# Patient Record
Sex: MALE | Race: WHITE | NOT HISPANIC OR LATINO | Employment: PART TIME | URBAN - METROPOLITAN AREA
[De-identification: names, ages, dates, MRNs, and addresses within clinical notes are randomized per-mention and may not be internally consistent; named-entity substitution may affect disease eponyms.]

---

## 2021-02-02 ENCOUNTER — HOSPITAL ENCOUNTER (EMERGENCY)
Facility: HOSPITAL | Age: 21
Discharge: HOME | End: 2021-02-02
Admitting: EMERGENCY MEDICINE
Payer: MEDICARE

## 2021-02-02 ENCOUNTER — APPOINTMENT (EMERGENCY)
Dept: RADIOLOGY | Facility: HOSPITAL | Age: 21
End: 2021-02-02
Payer: MEDICARE

## 2021-02-02 VITALS
DIASTOLIC BLOOD PRESSURE: 118 MMHG | HEART RATE: 101 BPM | OXYGEN SATURATION: 99 % | WEIGHT: 145 LBS | RESPIRATION RATE: 19 BRPM | SYSTOLIC BLOOD PRESSURE: 159 MMHG | HEIGHT: 66 IN | TEMPERATURE: 97 F | BODY MASS INDEX: 23.3 KG/M2

## 2021-02-02 DIAGNOSIS — S62.609A: ICD-10-CM

## 2021-02-02 DIAGNOSIS — S61.212A LACERATION OF RIGHT MIDDLE FINGER: Primary | ICD-10-CM

## 2021-02-02 LAB
AMPHETAMINES SERPL QL SCN: NEGATIVE
BARBITURATES UR QL: NEGATIVE
BENZODIAZ UR QL: NEGATIVE
COCAINE UR QL: NEGATIVE
METHADONE UR QL: NEGATIVE
OPIATES UR QL SCN: NEGATIVE
OXYCODONE+OXYMORPHONE UR QL SCN: POSITIVE
PCP UR QL: NEGATIVE
THC UR QL: POSITIVE

## 2021-02-02 PROCEDURE — 73130 X-RAY EXAM OF HAND: CPT

## 2021-02-02 PROCEDURE — 99283 EMERGENCY DEPT VISIT LOW MDM: CPT

## 2021-02-02 PROCEDURE — 80307 DRUG TEST PRSMV CHEM ANLYZR: CPT | Performed by: PHYSICIAN ASSISTANT

## 2021-02-02 PROCEDURE — 99284 EMERGENCY DEPT VISIT MOD MDM: CPT | Performed by: PHYSICIAN ASSISTANT

## 2021-02-02 PROCEDURE — 12002 RPR S/N/AX/GEN/TRNK2.6-7.5CM: CPT | Performed by: PHYSICIAN ASSISTANT

## 2021-02-02 PROCEDURE — 96372 THER/PROPH/DIAG INJ SC/IM: CPT

## 2021-02-02 PROCEDURE — 29125 APPL SHORT ARM SPLINT STATIC: CPT | Performed by: PHYSICIAN ASSISTANT

## 2021-02-02 RX ORDER — OXYCODONE HYDROCHLORIDE AND ACETAMINOPHEN 5; 325 MG/1; MG/1
1 TABLET ORAL ONCE
Status: COMPLETED | OUTPATIENT
Start: 2021-02-02 | End: 2021-02-02

## 2021-02-02 RX ORDER — CEPHALEXIN 250 MG/1
500 CAPSULE ORAL ONCE
Status: COMPLETED | OUTPATIENT
Start: 2021-02-02 | End: 2021-02-02

## 2021-02-02 RX ORDER — OXYCODONE HYDROCHLORIDE AND ACETAMINOPHEN 5; 325 MG/1; MG/1
1 TABLET ORAL EVERY 8 HOURS PRN
Qty: 15 TABLET | Refills: 0 | Status: SHIPPED | OUTPATIENT
Start: 2021-02-02 | End: 2021-02-25

## 2021-02-02 RX ORDER — MORPHINE SULFATE 4 MG/ML
4 INJECTION, SOLUTION INTRAMUSCULAR; INTRAVENOUS EVERY 4 HOURS PRN
Status: DISCONTINUED | OUTPATIENT
Start: 2021-02-02 | End: 2021-02-02 | Stop reason: HOSPADM

## 2021-02-02 RX ORDER — CEPHALEXIN 500 MG/1
500 CAPSULE ORAL 4 TIMES DAILY
Qty: 28 CAPSULE | Refills: 0 | Status: SHIPPED | OUTPATIENT
Start: 2021-02-02 | End: 2021-02-09

## 2021-02-02 RX ORDER — LIDOCAINE HYDROCHLORIDE 10 MG/ML
20 INJECTION, SOLUTION EPIDURAL; INFILTRATION; INTRACAUDAL; PERINEURAL ONCE
Status: COMPLETED | OUTPATIENT
Start: 2021-02-02 | End: 2021-02-02

## 2021-02-02 RX ADMIN — OXYCODONE HYDROCHLORIDE AND ACETAMINOPHEN 1 TABLET: 5; 325 TABLET ORAL at 19:27

## 2021-02-02 RX ADMIN — CEPHALEXIN 500 MG: 250 CAPSULE ORAL at 20:38

## 2021-02-02 RX ADMIN — LIDOCAINE HYDROCHLORIDE 20 ML: 10 INJECTION, SOLUTION EPIDURAL; INFILTRATION; INTRACAUDAL; PERINEURAL at 19:28

## 2021-02-02 RX ADMIN — MORPHINE SULFATE 4 MG: 4 INJECTION INTRAVENOUS at 20:37

## 2021-02-02 NOTE — Clinical Note
Luna Rodriguez was seen and treated in our emergency department on 2/2/2021  No work until cleared by Family Doctor/Orthopedics        Diagnosis:     Rosie Giraldo    He may return on this date: If you have any questions or concerns, please don't hesitate to call        Janice Rodriguez PA-C    ______________________________           _______________          _______________  Hospital Representative                              Date                                Time

## 2021-02-03 ENCOUNTER — HOSPITAL ENCOUNTER (EMERGENCY)
Facility: HOSPITAL | Age: 21
Discharge: HOME | End: 2021-02-03
Attending: EMERGENCY MEDICINE | Admitting: EMERGENCY MEDICINE
Payer: MEDICARE

## 2021-02-03 VITALS
HEART RATE: 76 BPM | BODY MASS INDEX: 23.4 KG/M2 | SYSTOLIC BLOOD PRESSURE: 137 MMHG | OXYGEN SATURATION: 98 % | TEMPERATURE: 97.7 F | RESPIRATION RATE: 18 BRPM | WEIGHT: 145 LBS | DIASTOLIC BLOOD PRESSURE: 84 MMHG

## 2021-02-03 DIAGNOSIS — Z51.89 VISIT FOR WOUND CHECK: Primary | ICD-10-CM

## 2021-02-03 PROCEDURE — 99282 EMERGENCY DEPT VISIT SF MDM: CPT | Performed by: EMERGENCY MEDICINE

## 2021-02-03 PROCEDURE — 99283 EMERGENCY DEPT VISIT LOW MDM: CPT

## 2021-02-03 NOTE — TELEPHONE ENCOUNTER
email to admin    Good Morning,    I was contacted by patient Supa Luu ([phone]) who states he needs to see DR Steve Wolfe in [place] [address] for a  finger laceration (er report in epic)    Can you help accommodate an appropriate appt?     CB#   CB# [phone]

## 2021-02-03 NOTE — DISCHARGE INSTRUCTIONS
Return sooner to the Emergency Department if increased pain, fever, pus, redness, swelling, red streaks, vomiting, weakness, numbness, bleeding  Elevate  Keep wound dry for 2 days  Sutures/staples out in 10-14 days  Follow up with hand surgery in office for continued care

## 2021-02-03 NOTE — TELEPHONE ENCOUNTER
Patient called and was in the ER last night,he wanted to know if he should go back to the ER because his finger was bleeding  Patient is trying to schedule with Dr Gasper Barton

## 2021-02-03 NOTE — ED PROVIDER NOTES
History  Chief Complaint   Patient presents with    Finger Laceration     Patient reports he put his R hand in the snow plow ~20 minutes ago  Patient has lacerations to the middle finger  22 yo male with right hand injury  Right hand dominant  Pt reports he was trying to unclog the  he was using and he thought it was completely off and when he reached his hand in and it caught his gloves  He suffered laceration of third digit right hand  Pain across all digits  No numbness, tingling, weakness  Bleeding controlled with pressure  Tetanus is up to date  History provided by:  Patient   used: No    Finger Laceration  Location: right hand middle finger  Length:  4  Depth: Through muscle  Quality: jagged    Bleeding: controlled with pressure    Time since incident:  1 hour  Injury mechanism:   Pain details:     Quality:  Throbbing    Severity:  Severe    Timing:  Constant    Progression:  Unchanged  Foreign body present:  No foreign bodies  Relieved by:  Nothing  Worsened by:  Nothing  Ineffective treatments:  None tried  Tetanus status:  Up to date  Associated symptoms: no fever, no focal weakness, no numbness, no rash, no redness, no swelling and no streaking        None       History reviewed  No pertinent past medical history  History reviewed  No pertinent surgical history  History reviewed  No pertinent family history  I have reviewed and agree with the history as documented  E-Cigarette/Vaping     E-Cigarette/Vaping Substances     Social History     Tobacco Use    Smoking status: Never Smoker    Smokeless tobacco: Never Used   Substance Use Topics    Alcohol use: Not Currently    Drug use: Yes     Types: Marijuana       Review of Systems   Constitutional: Negative  Negative for fever  HENT: Negative for dental problem, ear pain, hearing loss, nosebleeds, tinnitus and trouble swallowing      Eyes: Negative for photophobia, pain and visual disturbance  Respiratory: Negative for apnea, cough, choking, chest tightness, shortness of breath, wheezing and stridor  Gastrointestinal: Negative for abdominal pain, nausea and vomiting  Genitourinary: Negative for decreased urine volume and enuresis  Musculoskeletal: Negative for back pain, myalgias, neck pain and neck stiffness  Skin: Negative for pallor, rash and wound  Allergic/Immunologic: Negative  Neurological: Negative for dizziness, focal weakness, syncope, speech difficulty, weakness, light-headedness, numbness and headaches  Physical Exam  Physical Exam  Vitals signs and nursing note reviewed  Constitutional:       Appearance: He is well-developed  HENT:      Head: Normocephalic and atraumatic  Eyes:      Conjunctiva/sclera: Conjunctivae normal    Neck:      Musculoskeletal: Neck supple  Cardiovascular:      Rate and Rhythm: Normal rate and regular rhythm  Heart sounds: No murmur  Pulmonary:      Effort: Pulmonary effort is normal  No respiratory distress  Breath sounds: Normal breath sounds  Abdominal:      Palpations: Abdomen is soft  Tenderness: There is no abdominal tenderness  Musculoskeletal:        Hands:       Comments: There is a complex laceration/avulsion of the third digit/middle finger  Area of the wound appears to have skin completely avulsed and does not approximate easily  Inspection of the wound appears to have flexor tendon injury  He does however have ability     Skin:     General: Skin is warm and dry  Neurological:      Mental Status: He is alert           Vital Signs  ED Triage Vitals   Temperature Pulse Respirations Blood Pressure SpO2   02/02/21 1919 02/02/21 1919 02/02/21 1919 02/02/21 1919 02/02/21 1919   (!) 97 °F (36 1 °C) 101 19 (!) 159/118 99 %      Temp src Heart Rate Source Patient Position - Orthostatic VS BP Location FiO2 (%)   -- 02/02/21 1919 02/02/21 1919 02/02/21 1919 --    Monitor Sitting Left arm       Pain Score       02/02/21 1927       Worst Possible Pain           Vitals:    02/02/21 1919   BP: (!) 159/118   Pulse: 101   Patient Position - Orthostatic VS: Sitting         Visual Acuity      ED Medications  Medications   morphine (PF) 4 mg/mL injection 4 mg (4 mg Intramuscular Given 2/2/21 2037)   oxyCODONE-acetaminophen (PERCOCET) 5-325 mg per tablet 1 tablet (1 tablet Oral Given 2/2/21 1927)   lidocaine (PF) (XYLOCAINE-MPF) 1 % injection 20 mL (20 mL Infiltration Given 2/2/21 1928)   cephalexin (KEFLEX) capsule 500 mg (500 mg Oral Given 2/2/21 2038)       Diagnostic Studies  Results Reviewed     Procedure Component Value Units Date/Time    Rapid drug screen, urine [[id number]]  (Abnormal) Collected: 02/02/21 2046    Lab Status: Final result Specimen: Urine, Other Updated: 02/02/21 2106     Amph/Meth UR Negative     Barbiturate Ur Negative     Benzodiazepine Urine Negative     Cocaine Urine Negative     Methadone Urine Negative     Opiate Urine Negative     PCP Ur Negative     THC Urine Positive     Oxycodone Urine Positive    Narrative:      Presumptive report  If requested, specimen will be sent to reference lab for confirmation  FOR MEDICAL PURPOSES ONLY  IF CONFIRMATION NEEDED PLEASE CONTACT THE LAB WITHIN 5 DAYS  Drug Screen Cutoff Levels:  AMPHETAMINE/METHAMPHETAMINES  1000 ng/mL  BARBITURATES     200 ng/mL  BENZODIAZEPINES     200 ng/mL  COCAINE      300 ng/mL  METHADONE      300 ng/mL  OPIATES      300 ng/mL  PHENCYCLIDINE     25 ng/mL  THC       50 ng/mL  OXYCODONE      100 ng/mL                 XR hand 3+ vw right   Final Result by Lokesh Hernandez DO (02/02 2006)      Fractures of the 2nd through 4th fingers as above  Workstation performed: [id number]                    Procedures  Laceration repair    Date/Time: 2/2/2021 8:36 PM  Performed by: Gracia Chamberlain PA-C  Authorized by: Gracia Chamberlain PA-C   Consent: Verbal consent obtained    Consent given by: patient  Patient understanding: patient states understanding of the procedure being performed  Patient identity confirmed: verbally with patient, arm band, provided demographic data and hospital-assigned identification number  Body area: upper extremity  Location details: right long finger  Wound length (cm): 3  Foreign bodies: no foreign bodies  Tendon involvement: superficial  Anesthesia: digital block    Anesthesia:  Local Anesthetic: lidocaine 1% without epinephrine  Anesthetic total: 4 mL    Wound Dehiscence:    Secondary closure or dehiscence: complex    Procedure Details:  Irrigation solution: saline  Irrigation method: syringe  Amount of cleaning: standard  Debridement: none  Degree of undermining: none  Wound skin closure material used: 4-0 ethilon  Number of sutures: 9  Technique: simple  Approximation: loose  Approximation difficulty: complex  Dressing: splint  Patient tolerance: patient tolerated the procedure well with no immediate complications    Splint application    Date/Time: 2/2/2021 8:38 PM  Performed by: Osmin Hernandez PA-C  Authorized by: Osmin Hernandez PA-C   Universal Protocol:  Consent: Verbal consent obtained  Risks and benefits: risks, benefits and alternatives were discussed  Consent given by: patient  Patient understanding: patient states understanding of the procedure being performed  Patient identity confirmed: verbally with patient, arm band and provided demographic data      Pre-procedure details:     Sensation:  Normal  Procedure details:     Laterality:  Right    Location:  Wrist    Wrist:  R wrist    Splint type:  Volar short arm  Post-procedure details:     Pain:  Unchanged    Sensation:  Normal    Patient tolerance of procedure: Tolerated well, no immediate complications             ED Course  ED Course as of Feb 02 2108   Tue Feb 02, 2021 2024 Patient is requesting a drug screen  Apparently the patient's boss called him while here and instructed him to get one   I explained to the patient that there is no clinical reason for me to obtain it but if he would like the drug screen ordered it would be done  Pt is requesting to have the urine drug screen done  He understands he does not need to have it done for any medical reason  He would still like to have it done  SBIRT 20yo+      Most Recent Value   SBIRT (22 yo +)   In order to provide better care to our patients, we are screening all of our patients for alcohol and drug use  Would it be okay to ask you these screening questions? No Filed at: 02/02/2021 1921   Initial Alcohol Screen: US AUDIT-C    1  How often do you have a drink containing alcohol?  0 Filed at: 02/02/2021 1921   2  How many drinks containing alcohol do you have on a typical day you are drinking? 0 Filed at: 02/02/2021 1921   3a  Male UNDER 65: How often do you have five or more drinks on one occasion? 0 Filed at: 02/02/2021 1921   3b  FEMALE Any Age, or MALE 65+: How often do you have 4 or more drinks on one occassion? 0 Filed at: 02/02/2021 1921   Audit-C Score  0 Filed at: 02/02/2021 1921   CAMILA: How many times in the past year have you    Used an illegal drug or used a prescription medication for non-medical reasons? Never Filed at: 02/02/2021 1921                    MDM  Number of Diagnoses or Management Options  Laceration of right middle finger: new and requires workup  Multiple fractures of fingers: new and requires workup  Diagnosis management comments: DDx including but not limited to: laceration, deep structure involvement, foreign body, fracture, wound infection  Plan: XR  Wound inspection, copious irrigation  Closure          Amount and/or Complexity of Data Reviewed  Tests in the radiology section of CPT®: ordered and reviewed  Independent visualization of images, tracings, or specimens: yes    Risk of Complications, Morbidity, and/or Mortality  Presenting problems: moderate  Management options: low  General comments: 22 yo s/p hand vs snowblower  Fractures of right 2nd through 4th digits  Complex laceration of distal pad right middle finger  Suspect tendon involvement  Wound was difficult to approximate  Because the underlying fracture we will start on abx  Strongly encouraged pt to f/u with hand surgery  He understands the importance of follow up given the extent of the wound and the tendon involvement  Return parameters provided  Pt understands and agrees with plan  Patient Progress  Patient progress: stable      Disposition  Final diagnoses:   Laceration of right middle finger   Multiple fractures of fingers - 2nd through 4th digits  Two fractures of third digit     Time reflects when diagnosis was documented in both MDM as applicable and the Disposition within this note     Time User Action Codes Description Comment    2/2/2021  8:26 PM Faythe Schlizetter Add [I59 309B] Laceration of right middle finger     2/2/2021  8:26 PM Faythe Schiller Add [S62 609A] Multiple fractures of fingers     2/2/2021  8:27 PM Faythe Schiller Modify [S62 609A] Multiple fractures of fingers 2nd through 4th digits  Two fractures of third digit      ED Disposition     ED Disposition Condition Date/Time Comment    Discharge Stable Tue Feb 2, 2021  8:23 PM Mei Sahu discharge to home/self care              Follow-up Information     Follow up With Specialties Details Why Contact Info    Lesa Zambrano MD Orthopedic Surgery, Hand Surgery, Orthopedics Call in 1 day  [address]            Discharge Medication List as of 2/2/2021  8:29 PM      START taking these medications    Details   cephalexin (Keflex) 500 mg capsule Take 1 capsule (500 mg total) by mouth 4 (four) times a day for 7 days, Starting Tue 2/2/2021, Until Tue 2/9/2021, Print      oxyCODONE-acetaminophen (PERCOCET) 5-325 mg per tablet Take 1 tablet by mouth every 8 (eight) hours as needed for moderate painMax Daily Amount: 3 tablets, Starting Tue 2/2/2021, Print               PDMP Review     None          ED Provider  Electronically Signed by           Gracia Chamberlain PA-C  02/02/21 9723

## 2021-02-03 NOTE — ED PROVIDER NOTES
History  Chief Complaint   Patient presents with    Follow-Up Laceration     pt brought in by EMS for re-evaluation of right arm laceration that was treated here yesterday  pt c/o increased bleeding     HPI patient is a 25-year-old male status post distal tuft fracture with laceration on the tip of his right middle finger after he put his hand into a  to unclog the shoot  Patient reports today he had some bleeding from laceration he took oxycodone for pain and did not want to drive so he called an ambulance to come back to the hospital for a wound check  Patient reports pain in the tip of his finger  He reports he had some intermittent bleeding and his splint got some blood on it so he came back  Past medical history previously healthy  Family history noncontributory  Social history, nonsmoker no history of drug abuse    Prior to Admission Medications   Prescriptions Last Dose Informant Patient Reported? Taking? cephalexin (Keflex) 500 mg capsule   No No   Sig: Take 1 capsule (500 mg total) by mouth 4 (four) times a day for 7 days   oxyCODONE-acetaminophen (PERCOCET) 5-325 mg per tablet   No No   Sig: Take 1 tablet by mouth every 8 (eight) hours as needed for moderate painMax Daily Amount: 3 tablets      Facility-Administered Medications: None       History reviewed  No pertinent past medical history  History reviewed  No pertinent surgical history  History reviewed  No pertinent family history  I have reviewed and agree with the history as documented  E-Cigarette/Vaping     E-Cigarette/Vaping Substances     Social History     Tobacco Use    Smoking status: Never Smoker    Smokeless tobacco: Never Used   Substance Use Topics    Alcohol use: Not Currently    Drug use: Yes     Types: Marijuana       Review of Systems   Skin: Positive for wound  Physical Exam  Physical Exam  Constitutional:       Appearance: Normal appearance  Skin:     Comments:  There is a laceration at the tip of the right middle finger with some skin loss, there is some devitalized tissue  No redness no warmth no sign of cellulitis  No deformity  Neurological:      Mental Status: He is alert  Vital Signs  ED Triage Vitals [02/03/21 1331]   Temperature Pulse Respirations Blood Pressure SpO2   97 7 °F (36 5 °C) 76 18 137/84 98 %      Temp Source Heart Rate Source Patient Position - Orthostatic VS BP Location FiO2 (%)   Oral Monitor Lying Left arm --      Pain Score       --           Vitals:    02/03/21 1331   BP: 137/84   Pulse: 76   Patient Position - Orthostatic VS: Lying         Visual Acuity      ED Medications  Medications - No data to display    Diagnostic Studies  Results Reviewed     None                 No orders to display              Procedures  Procedures         ED Course                  wound was re-dressed by me  New Splint applied by technician    Splint application: Splint was applied, Applied by technician    Patient was seen during the outbreak of the corona virus epidemic  Resources are limited due to the severity of patient illnesses associated with virus  Testing is also limited at this time  Discussed with patient at the time of this evaluation  Due to the fact that limited resources are available -treatment options are limited  MDM medical decision making 57-year-old male presents emergency department for a wound check status post distal tuft fracture with laceration of his right middle finger  Discussed hand follow-up, discussed indications to return      Disposition  Final diagnoses:   Visit for wound check     Time reflects when diagnosis was documented in both MDM as applicable and the Disposition within this note     Time User Action Codes Description Comment    2/3/2021  1:36 PM Deann Yates Add [Z51 89] Visit for wound check       ED Disposition     ED Disposition Condition Date/Time Comment    Discharge Stable Wed Feb 3, 2021  1:36 PM Oskar Holt Quinn discharge to home/self care  Follow-up Information     Follow up With Specialties Details Why Delma Duarte MD Orthopedic Surgery, Hand Surgery, Orthopedics   [address] [address]  [phone]            Discharge Medication List as of 2/3/2021  1:38 PM      CONTINUE these medications which have NOT CHANGED    Details   cephalexin (Keflex) 500 mg capsule Take 1 capsule (500 mg total) by mouth 4 (four) times a day for 7 days, Starting Tue 2/2/2021, Until Tue 2/9/2021, Print      oxyCODONE-acetaminophen (PERCOCET) 5-325 mg per tablet Take 1 tablet by mouth every 8 (eight) hours as needed for moderate painMax Daily Amount: 3 tablets, Starting Tue 2/2/2021, Print           No discharge procedures on file      PDMP Review     None          ED Provider  Electronically Signed by           Saul Nunez MD  02/03/21 3659       Saul Nunez MD  02/11/21 3682

## 2021-02-03 NOTE — DISCHARGE INSTRUCTIONS
Keep the dressing on  Elevation  Continue  analgesics as needed and antibioticsis as prescribed  Follow-up with Dr Pina Slight

## 2021-02-04 ENCOUNTER — OFFICE VISIT (OUTPATIENT)
Dept: OCCUPATIONAL THERAPY | Facility: CLINIC | Age: 21
End: 2021-02-04
Payer: MEDICARE

## 2021-02-04 VITALS
WEIGHT: 145 LBS | HEIGHT: 66 IN | SYSTOLIC BLOOD PRESSURE: 110 MMHG | BODY MASS INDEX: 23.3 KG/M2 | DIASTOLIC BLOOD PRESSURE: 78 MMHG | HEART RATE: 89 BPM

## 2021-02-04 DIAGNOSIS — S62.609A: ICD-10-CM

## 2021-02-04 DIAGNOSIS — S62.609A MULTIPLE CLOSED FRACTURES OF FINGER, INITIAL ENCOUNTER: ICD-10-CM

## 2021-02-04 DIAGNOSIS — S61.212A LACERATION OF RIGHT MIDDLE FINGER: ICD-10-CM

## 2021-02-04 PROCEDURE — L3933 FO W/O JOINTS CF: HCPCS

## 2021-02-04 PROCEDURE — 99203 OFFICE O/P NEW LOW 30 MIN: CPT | Performed by: ORTHOPAEDIC SURGERY

## 2021-02-04 NOTE — PROGRESS NOTES
Orthosis    Diagnosis:   1  Multiple fractures of fingers  Ambulatory referral to PT/OT hand therapy    2nd through 4th digits  Two fractures of third digit     Indication: Fracture    Location: Right  index finger, long finger and ring finger  Supplies: Custom Fit Orthotic  Orthosis type: finger tip protection splints IF and RF, full digit clam shell splint MF  Wearing Schedule: Remove for hygiene only  Describe Position: IF & RF DIPs ext, PIPs free; MF DIP/PIP in ext    Precautions: Fracture, Universal (skin contact/breakdown) and open wounds    Patient or Caregiver expresses understanding of wearing Schedule and Precautions? Yes  Patient or Caregiver able to don/doff orthotic independently? Yes    Written orders provided to patient?  Yes  Orders Obtained: Written  Orders Obtained from: Rasta Alaniz PA-C    Return for evaluation and treatment Yes Pt to return for wound check and suture removal in 1 week

## 2021-02-04 NOTE — PROGRESS NOTES
CHIEF COMPLAINT:  Chief Complaint   Patient presents with    Right Hand - Pain       SUBJECTIVE:  Rakesh Bonds is a 23y o  year old male who presents Right hand laceration  Patient states his injury occurred on 02/02/2021 while at work  He states that he put his hand into a  to unclog it when it recoiled and caught his index, long and ring finger of the right hand  He went to the emergency room as he sustained a laceration over the volar aspect of the long finger  Sutures were placed into the laceration  He had x-rays which demonstrated tuft fractures of the index, long, ring finger and a middle phalanx fracture on the long finger  He is placed into splint instructed to follow up with Orthopedics  Today presents with pain over the index, long and ring finger  He notes decreased range of motion of these digits as well  He denies any numbness or tingling  PAST MEDICAL HISTORY:  History reviewed  No pertinent past medical history  PAST SURGICAL HISTORY:  History reviewed  No pertinent surgical history  FAMILY HISTORY:  History reviewed  No pertinent family history      SOCIAL HISTORY:  Social History     Tobacco Use    Smoking status: Never Smoker    Smokeless tobacco: Never Used   Substance Use Topics    Alcohol use: Not Currently    Drug use: Yes     Types: Marijuana       MEDICATIONS:    Current Outpatient Medications:     cephalexin (Keflex) 500 mg capsule, Take 1 capsule (500 mg total) by mouth 4 (four) times a day for 7 days, Disp: 28 capsule, Rfl: 0    oxyCODONE-acetaminophen (PERCOCET) 5-325 mg per tablet, Take 1 tablet by mouth every 8 (eight) hours as needed for moderate painMax Daily Amount: 3 tablets, Disp: 15 tablet, Rfl: 0    ALLERGIES:  Allergies   Allergen Reactions    Concerta [Methylphenidate] Rash    Seroquel [Quetiapine] Rash       REVIEW OF SYSTEMS:  Review of Systems    ROS:   General: no fever, no chills  HEENT:  No loss of hearing or eyesight problems  Eyes:  No red eyes  Respiratory:  No coughing, shortness of breath or wheezing  Cardiovascular:  No chest pain, no palpitations  GI:  Abdomen soft nontender, denies nausea  Endocrine:  No muscle weakness, no frequent urination, no excessive thirst  Urinary:  No dysuria, no incontinence  Musculoskeletal: see HPI and PE  SKIN:  No skin rash, no dry skin  Neurological:  No headaches, no confusion  Psychiatric:  No suicide thoughts, no anxiety, no depression  Review of all other systems is negative    VITALS:  Vitals:    02/04/21 1303   BP: 110/78   Pulse: 89       LABS:  HgA1c: No results found for: HGBA1C  BMP: No results found for: GLUCOSE, CALCIUM, NA, K, CO2, CL, BUN, CREATININE    _____________________________________________________  PHYSICAL EXAMINATION:  General: well developed and well nourished, alert, oriented times 3 and appears comfortable  Psychiatric: Normal  HEENT: Trachea Midline, No torticollis  Pulmonary: No audible wheezing or strider  Cardiovascular: No discernable arrhythmia   Skin: No masses, erythema, lacerations, fluctation, ulcerations  Neurovascular: Sensation Intact to the Median, Ulnar, Radial Nerve, Motor Intact to the Median, Ulnar, Radial Nerve and Pulses Intact    MUSCULOSKELETAL EXAMINATION:    Right hand index, long and ring finger   Mild swelling noted of the long finger with sutures intact  Laceration noted over the volar aspect of the long finger near the DIP joint which is semi lunar   small amount of blood drainage noted over the tip of the long finger  Patient is able to actively flex the DIP joint slightly      ___________________________________________________  STUDIES REVIEWED:  Images obtained on 02/02/2021 of the Right hand 3 views demonstrate Tuft fractures of the index, long and  ring finger    Middle phalanx fracture of the long finger      PROCEDURES PERFORMED:  Procedures  No Procedures performed today    _____________________________________________________  ASSESSMENT/PLAN:    Tuft fracture of index, long and ring finger right hand  Middle phalanx fracture of long finger  -  Patient will obtain a finger tip protection splint for the index and ring finger, a clamshell splint to include the PIP joint for the long finger  -  He is to continue taking antibiotics as prescribed  -  He will go to occupational therapy for suture removal in 1 week  -  He will follow-up with us in 3 weeks for re-evaluation    Follow Up:  Return in about 3 weeks (around 2/25/2021)  Work/school status:  No restrictions    To Do Next Visit:  Re-evaluation of current issue        Scribe Attestation    I,:  Dennie Prows, PA-C am acting as a scribe while in the presence of the attending physician :       I,:  Aidan Boyd MD personally performed the services described in this documentation    as scribed in my presence :           Portions of the record may have been created with voice recognition software  Occasional wrong word or "sound a like" substitutions may have occurred due to the inherent limitations of voice recognition software  Read the chart carefully and recognize, using context, where substitutions have occurred

## 2021-02-08 ENCOUNTER — OFFICE VISIT (OUTPATIENT)
Dept: OCCUPATIONAL THERAPY | Facility: CLINIC | Age: 21
End: 2021-02-08
Payer: MEDICARE

## 2021-02-08 DIAGNOSIS — S62.609D: Primary | ICD-10-CM

## 2021-02-08 PROCEDURE — 97763 ORTHC/PROSTC MGMT SBSQ ENC: CPT | Performed by: OCCUPATIONAL THERAPIST

## 2021-02-08 NOTE — PROGRESS NOTES
Orthosis    Diagnosis:   No diagnosis found  Indication: Fracture    Location: Right  index finger, long finger and ring finger  Supplies: Custom Fit Orthotic  Orthosis type: finger tip protection splints IF and RF, full digit clam shell splint MF  Wearing Schedule: Remove for hygiene only  Describe Position: IF & RF DIPs ext, PIPs free; MF DIP/PIP in ext    Precautions: Fracture, Universal (skin contact/breakdown) and open wounds    Patient or Caregiver expresses understanding of wearing Schedule and Precautions? Yes  Patient or Caregiver able to don/doff orthotic independently? Yes    Written orders provided to patient? Yes  Orders Obtained: Written  Orders Obtained from: Huey Opitz, PA-C    Return for evaluation and treatment Yes Pt to return for wound check and suture removal in 1 week    Daily Note     Today's date: 2021  Patient name: Leora Becerra  : 2000  MRN: [id number]  Referring provider: Melquiades Colbert PA-C  Dx: No diagnosis found  Subjective: " The middle finger is still really tender"  Equisite tenderness of the III tip  Objective:   Pt  Seen today for dressing change and splint adjustment  15 min tx time  Assessment:  Small area of maceration present of proximal edge of suture line as pt  has been using a non adhesive telfa dressing     No bleeding, no signs of infection  Adjustment of splint for  III necessary due to looseness post edema reduction  Plan:   Pt  Will return in 3 days for suture removal, wound care and splint adjustments prn

## 2021-02-11 ENCOUNTER — OFFICE VISIT (OUTPATIENT)
Dept: OCCUPATIONAL THERAPY | Facility: CLINIC | Age: 21
End: 2021-02-11
Payer: MEDICARE

## 2021-02-11 DIAGNOSIS — S62.609D: Primary | ICD-10-CM

## 2021-02-11 PROCEDURE — 97140 MANUAL THERAPY 1/> REGIONS: CPT | Performed by: OCCUPATIONAL THERAPIST

## 2021-02-11 NOTE — PROGRESS NOTES
Orthosis    Diagnosis:   No diagnosis found  Indication: Fracture    Location: Right  index finger, long finger and ring finger  Supplies: Custom Fit Orthotic  Orthosis type: finger tip protection splints IF and RF, full digit clam shell splint MF  Wearing Schedule: Remove for hygiene only  Describe Position: IF & RF DIPs ext, PIPs free; MF DIP/PIP in ext    Precautions: Fracture, Universal (skin contact/breakdown) and open wounds    Patient or Caregiver expresses understanding of wearing Schedule and Precautions? Yes  Patient or Caregiver able to don/doff orthotic independently? Yes    Written orders provided to patient? Yes  Orders Obtained: Written  Orders Obtained from: Cathy Peter PA-C    Return for evaluation and treatment Yes Pt to return for wound check and suture removal in 1 week    Daily Note     Today's date: 2021  Patient name: Boby Felty  : 2000  MRN: [id number]  Referring provider: Mae Grant PA-C  Dx: No diagnosis found  Subjective: " The middle finger is still really tender"  Equisite tenderness of the III tip  Objective:   Pt  Seen today for dressing change and splint adjustment and suture removal     15 min tx time  Dry scabbing present along suture line  Assessment:  Sutures removed today  Light bleeding, no signs of infection  Adjustment of splint for  III necessary due to looseness post edema reduction  Plan:   Pt  Will f/u with physician in 2 weeks  He will contact this office prn for any wound care or splint adjustments prn

## 2021-02-12 ENCOUNTER — TELEPHONE (OUTPATIENT)
Dept: OBGYN CLINIC | Facility: CLINIC | Age: 21
End: 2021-02-12

## 2021-02-25 ENCOUNTER — OFFICE VISIT (OUTPATIENT)
Dept: OBGYN CLINIC | Facility: CLINIC | Age: 21
End: 2021-02-25
Payer: MEDICARE

## 2021-02-25 ENCOUNTER — APPOINTMENT (OUTPATIENT)
Dept: RADIOLOGY | Facility: CLINIC | Age: 21
End: 2021-02-25
Payer: MEDICARE

## 2021-02-25 VITALS
WEIGHT: 145 LBS | HEIGHT: 66 IN | DIASTOLIC BLOOD PRESSURE: 84 MMHG | BODY MASS INDEX: 23.3 KG/M2 | HEART RATE: 80 BPM | SYSTOLIC BLOOD PRESSURE: 126 MMHG

## 2021-02-25 DIAGNOSIS — S62.609S: Primary | ICD-10-CM

## 2021-02-25 DIAGNOSIS — S62.609S: ICD-10-CM

## 2021-02-25 PROCEDURE — 73130 X-RAY EXAM OF HAND: CPT

## 2021-02-25 PROCEDURE — 99213 OFFICE O/P EST LOW 20 MIN: CPT | Performed by: ORTHOPAEDIC SURGERY

## 2021-02-25 NOTE — PROGRESS NOTES
CHIEF COMPLAINT:  Chief Complaint   Patient presents with    Right Hand - Follow-up       SUBJECTIVE:  Anna Morrison is a 23y o  year old male who presents for follow-up regarding  Tuft fracture of the index, long and ring finger of the right hand and middle phalanx fracture of the long finger right hand  Patient has been using a finger tip protection splints and clamshell splint of the long finger  He states he does note some discomfort from time to time  He  Notes no numbness or tingling at this time  His injury occurred on 02/02/2021  PAST MEDICAL HISTORY:  History reviewed  No pertinent past medical history  PAST SURGICAL HISTORY:  History reviewed  No pertinent surgical history  FAMILY HISTORY:  History reviewed  No pertinent family history  SOCIAL HISTORY:  Social History     Tobacco Use    Smoking status: Never Smoker    Smokeless tobacco: Never Used   Substance Use Topics    Alcohol use: Not Currently    Drug use: Yes     Types: Marijuana       MEDICATIONS:  No current outpatient medications on file      ALLERGIES:  Allergies   Allergen Reactions    Concerta [Methylphenidate] Rash    Seroquel [Quetiapine] Rash       REVIEW OF SYSTEMS:  Review of Systems    ROS:   General: no fever, no chills  HEENT:  No loss of hearing or eyesight problems  Eyes:  No red eyes  Respiratory:  No coughing, shortness of breath or wheezing  Cardiovascular:  No chest pain, no palpitations  GI:  Abdomen soft nontender, denies nausea  Endocrine:  No muscle weakness, no frequent urination, no excessive thirst  Urinary:  No dysuria, no incontinence  Musculoskeletal: see HPI and PE  SKIN:  No skin rash, no dry skin  Neurological:  No headaches, no confusion  Psychiatric:  No suicide thoughts, no anxiety, no depression  Review of all other systems is negative    VITALS:  Vitals:    02/25/21 1452   BP: 126/84   Pulse: 80       LABS:  HgA1c: No results found for: HGBA1C  BMP: No results found for: GLUCOSE, CALCIUM, NA, K, CO2, CL, BUN, CREATININE    _____________________________________________________  PHYSICAL EXAMINATION:  General: well developed and well nourished, alert, oriented times 3 and appears comfortable  Psychiatric: Normal  HEENT: Trachea Midline, No torticollis  Pulmonary: No audible wheezing or respiratory distress   Skin: No masses, erythema, lacerations, fluctation, ulcerations  Neurovascular: Sensation Intact to the Median, Ulnar, Radial Nerve, Motor Intact to the Median, Ulnar, Radial Nerve and Pulses Intact    MUSCULOSKELETAL EXAMINATION:    Right hand index, long and ring finger   Eschar is noted on the long finger on the pulp  Mild swelling noted of the long finger, sutures were removed and the incision is healing well with no signs or symptoms of infection   Middle phalanx long finger fracture is stable with testing    ___________________________________________________  STUDIES REVIEWED:  Images obtained today of the Right hand 3 views demonstrate Fractures in stable alignment no evidence of displacement  PROCEDURES PERFORMED:  Procedures  No Procedures performed today    _____________________________________________________  ASSESSMENT/PLAN:    Tuft fracture of index, long and ring finger right hand  Middle phalanx fracture of long finger  - patient was advised to continue the use of a finger tip protection splints   -he can take Tylenol and ibuprofen as needed for pain  - he will follow up in 3 weeks for re-evaluation        Follow Up:  Return in about 3 weeks (around 3/18/2021)      Work/school status:  No restrictions    To Do Next Visit:  Re-evaluation of current issue      Scribe Attestation    I,:  Kyle Schaefer PA-C am acting as a scribe while in the presence of the attending physician :       I,:  Chevy Raygoza MD personally performed the services described in this documentation    as scribed in my presence :           Portions of the record may have been created with voice recognition software  Occasional wrong word or "sound a like" substitutions may have occurred due to the inherent limitations of voice recognition software  Read the chart carefully and recognize, using context, where substitutions have occurred

## 2021-03-30 VITALS
BODY MASS INDEX: 23.3 KG/M2 | HEART RATE: 76 BPM | WEIGHT: 145 LBS | HEIGHT: 66 IN | SYSTOLIC BLOOD PRESSURE: 131 MMHG | DIASTOLIC BLOOD PRESSURE: 77 MMHG

## 2021-03-30 DIAGNOSIS — S62.609S: Primary | ICD-10-CM

## 2021-03-30 PROCEDURE — 99213 OFFICE O/P EST LOW 20 MIN: CPT | Performed by: ORTHOPAEDIC SURGERY

## 2021-03-30 NOTE — PROGRESS NOTES
_____________________________________________________  CHIEF COMPLAINT:  Chief Complaint   Patient presents with    Right Hand - Follow-up     SUBJECTIVE:  Miguel Montez is a 23y o  year old male who presents for follow up regarding  Tuft fractures of the index, long and ring finger of the right hand and middle phalanx fracture of the long finger of the right arm sustained on 02/02/2021  Patient states that he has been using his finger tip protector splint as instructed  He only notes a very mild discomfort intermittently about the fingers  His main complaint is stiffness and lack of range of motion of fingers  Denies numbness and tingling, fever, chills  PAST MEDICAL HISTORY:  History reviewed  No pertinent past medical history  PAST SURGICAL HISTORY:  History reviewed  No pertinent surgical history  FAMILY HISTORY:  History reviewed  No pertinent family history  SOCIAL HISTORY:  Social History     Tobacco Use    Smoking status: Never Smoker    Smokeless tobacco: Never Used   Substance Use Topics    Alcohol use: Not Currently    Drug use: Yes     Types: Marijuana       MEDICATIONS:  No current outpatient medications on file  ALLERGIES:  Allergies   Allergen Reactions    Concerta [Methylphenidate] Rash    Seroquel [Quetiapine] Rash       REVIEW OF SYSTEMS:  Pertinent items are noted in HPI  A comprehensive review of systems was negative      LABS:  HgA1c: No results found for: HGBA1C  BMP: No results found for: GLUCOSE, CALCIUM, NA, K, CO2, CL, BUN, CREATININE        _____________________________________________________  PHYSICAL EXAMINATION:  General: well developed and well nourished, alert, oriented times 3 and appears comfortable  Psychiatric: Normal  HEENT: Trachea Midline, No torticollis  Cardiovascular: No discernable arrhythmia  Pulmonary: No wheezing or stridor  Skin: No masses, erythema, lacerations, fluctation, ulcerations  Neurovascular: Sensation Intact to the Median, Ulnar, Radial Nerve, Motor Intact to the Median, Ulnar, Radial Nerve and Pulses Intact    MUSCULOSKELETAL EXAMINATION:  Right Hand: Index, long and ring fingers  Minimal swelling noted about the long finger  Non tender to palpation about the index, long and ring fingers  Stiffness noted about the long finger DIP joint  Tendons firing appropriately  Incisions are clean, dry and intact without erythema  Middle phalanx fracture of the long finger is stable with testing         _____________________________________________________  STUDIES REVIEWED:  Images were reviewed of the right hand 3 views demonstrate: fractures remain in stable alignment and position with no evidence of displacement      PROCEDURES PERFORMED:  Procedures  No Procedures performed today                                                                                                                     ASSESSMENT/PLAN:    Assessment:   Tuft fractures of the index, long and ring fingers of the right hand   Middle phalanx fracture of the long finger  Injury sustained on 2/2/2021    Plan:   NSAIDs, will begin formal OT/HEP for ROM exercises  Patient was also instructed to discontinue use of finger tip protection splints  Follow Up:  6  week(s)    To Do Next Visit:  Re evaluation of current issue and x rays of the right hand         Scribe Attestation    I,:  Kim Nelson am acting as a scribe while in the presence of the attending physician :       I,:  Cyndi Richardson MD personally performed the services described in this documentation    as scribed in my presence :

## 2021-08-10 ENCOUNTER — HOSPITAL ENCOUNTER (EMERGENCY)
Facility: HOSPITAL | Age: 21
Discharge: HOME | End: 2021-08-10
Attending: EMERGENCY MEDICINE | Admitting: EMERGENCY MEDICINE
Payer: MEDICARE

## 2021-08-10 VITALS
OXYGEN SATURATION: 98 % | RESPIRATION RATE: 18 BRPM | DIASTOLIC BLOOD PRESSURE: 97 MMHG | HEART RATE: 78 BPM | TEMPERATURE: 98.3 F | SYSTOLIC BLOOD PRESSURE: 144 MMHG

## 2021-08-10 DIAGNOSIS — F32.A DEPRESSION: Primary | ICD-10-CM

## 2021-08-10 PROCEDURE — 99284 EMERGENCY DEPT VISIT MOD MDM: CPT | Performed by: EMERGENCY MEDICINE

## 2021-08-10 PROCEDURE — 99284 EMERGENCY DEPT VISIT MOD MDM: CPT

## 2021-08-10 NOTE — ED NOTES
Denied petition sent to [facility] @ [phone]      Jasen Meneses, RACHEAL  08/10/21  1986

## 2021-08-10 NOTE — ED NOTES
Crisis Alert Form faxed to [facility] Cathsheldon Nogueira, RACHEAL  08/10/21    [id number]
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
## 2021-08-10 NOTE — DISCHARGE INSTRUCTIONS
Please follow up with a therapist and potentially a psychiatrist as you are able  If you feel overwhelmed or that you need help emergently please return to the ED at any time

## 2021-08-10 NOTE — ED NOTES
Pt changing into scrubs at this time  Pt is calm and cooperative upon arrival  Pt was read 302 by this writer        Daphney Jones  08/10/21 2505
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
